# Patient Record
Sex: FEMALE | Race: WHITE | Employment: STUDENT | URBAN - METROPOLITAN AREA
[De-identification: names, ages, dates, MRNs, and addresses within clinical notes are randomized per-mention and may not be internally consistent; named-entity substitution may affect disease eponyms.]

---
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
## 2019-05-20 ENCOUNTER — OFFICE VISIT (OUTPATIENT)
Dept: FAMILY MEDICINE CLINIC | Facility: CLINIC | Age: 19
End: 2019-05-20

## 2019-05-20 VITALS
TEMPERATURE: 99 F | DIASTOLIC BLOOD PRESSURE: 80 MMHG | RESPIRATION RATE: 12 BRPM | BODY MASS INDEX: 19.32 KG/M2 | WEIGHT: 110.38 LBS | HEIGHT: 63.5 IN | HEART RATE: 80 BPM | SYSTOLIC BLOOD PRESSURE: 120 MMHG

## 2019-05-20 DIAGNOSIS — L02.91 ABSCESS: Primary | ICD-10-CM

## 2019-05-20 PROCEDURE — 99213 OFFICE O/P EST LOW 20 MIN: CPT | Performed by: FAMILY MEDICINE

## 2019-05-20 RX ORDER — SULFAMETHOXAZOLE AND TRIMETHOPRIM 800; 160 MG/1; MG/1
1 TABLET ORAL 2 TIMES DAILY
Qty: 20 TABLET | Refills: 0 | Status: SHIPPED | OUTPATIENT
Start: 2019-05-20 | End: 2019-05-30

## 2019-05-20 NOTE — PROGRESS NOTES
HPI:    Patient ID: Jalen Cross is a 20year old female. Patient presents with:  Abscess: under L arm      HPI   Patient is here with mom for 2 left underarm abscess for 3 days. She has a history of multiple abscess. Has not drained.  Red and hard to Cardiovascular: Normal rate. Pulmonary/Chest: Effort normal.   Skin: Lesion noted. Left axilla: 2 x 2 cm in diameter, another small lesion  tender abscess with erythema and induration. No fluctuance. No discharge. Both have not gotten to a head.

## 2019-06-05 ENCOUNTER — OFFICE VISIT (OUTPATIENT)
Dept: FAMILY MEDICINE CLINIC | Facility: CLINIC | Age: 19
End: 2019-06-05

## 2019-06-05 VITALS
RESPIRATION RATE: 16 BRPM | BODY MASS INDEX: 18.58 KG/M2 | HEART RATE: 72 BPM | HEIGHT: 64 IN | TEMPERATURE: 98 F | DIASTOLIC BLOOD PRESSURE: 66 MMHG | SYSTOLIC BLOOD PRESSURE: 102 MMHG | WEIGHT: 108.81 LBS

## 2019-06-05 DIAGNOSIS — Z00.00 ROUTINE HISTORY AND PHYSICAL EXAMINATION OF ADULT: Primary | ICD-10-CM

## 2019-06-05 DIAGNOSIS — Z87.2 HISTORY OF ABSCESS OF SKIN AND SUBCUTANEOUS TISSUE: ICD-10-CM

## 2019-06-05 DIAGNOSIS — L70.0 ACNE VULGARIS: ICD-10-CM

## 2019-06-05 PROCEDURE — 99395 PREV VISIT EST AGE 18-39: CPT | Performed by: FAMILY MEDICINE

## 2019-06-05 RX ORDER — DROSPIRENONE AND ETHINYL ESTRADIOL 0.02-3(28)
1 KIT ORAL DAILY
Qty: 3 PACKAGE | Refills: 3 | Status: SHIPPED | OUTPATIENT
Start: 2019-06-05 | End: 2020-04-01

## 2019-06-05 NOTE — PROGRESS NOTES
HPI:   Chris Corcoran is a 20year old female who presents for a complete physical exam.      Patient complains of nothing, feels great physically and mentally. School goes well, mom here as well, no concerns from her standpoint either.  Patient declines op feels well in general, no unexpected weight changes, no fevers  SKIN: denies any unusual skin lesions; acne helped by OCP  EYES:denies blurred vision or double vision  HEENT: denies nasal congestion, sinus pain or ST  LUNGS: denies shortness of breath with History of abscess of skin and subcutaneous tissue  Recent infection resolved; washing with hibiclens now, let me know if recurrence      The patient indicates understanding of these issues and agrees to the plan.   The patient is asked to return for CPX in

## 2019-11-25 ENCOUNTER — TELEPHONE (OUTPATIENT)
Dept: FAMILY MEDICINE CLINIC | Facility: CLINIC | Age: 19
End: 2019-11-25

## 2019-11-25 ENCOUNTER — NURSE ONLY (OUTPATIENT)
Dept: FAMILY MEDICINE CLINIC | Facility: CLINIC | Age: 19
End: 2019-11-25

## 2019-11-25 DIAGNOSIS — J02.9 SORE THROAT: Primary | ICD-10-CM

## 2019-11-25 PROCEDURE — 87880 STREP A ASSAY W/OPTIC: CPT | Performed by: FAMILY MEDICINE

## 2019-11-25 NOTE — TELEPHONE ENCOUNTER
Called the pt and advised that we can doa nurse primo and scheduled her for 2pm  Future Appointments   Date Time Provider Miriam Zelaya   11/25/2019  2:00 PM  [facility] [address] [facility] Saskia Lucas

## 2019-11-25 NOTE — PROGRESS NOTES
Rapid strep is negative- advised pt that we are sending the swab to the lab to confirm.  Advised to do salt water garggles, ibuprofen/tylenol  For discomfort, throat drops etc.  Advised that we will have the culture back in 3 days and if her symptoms get wo

## 2019-11-25 NOTE — TELEPHONE ENCOUNTER
Spoke with the pt and she has a sore throat for a few days and now there is white in the back of her throat and lymph nodes are swollen    No fever

## 2019-11-25 NOTE — TELEPHONE ENCOUNTER
Patient thinks she has strep wondering if anyone can see her. Possibly just a nurse apt for a swab? Please call back.

## 2019-11-30 ENCOUNTER — TELEPHONE (OUTPATIENT)
Dept: FAMILY MEDICINE CLINIC | Facility: CLINIC | Age: 19
End: 2019-11-30

## 2019-11-30 ENCOUNTER — OFFICE VISIT (OUTPATIENT)
Dept: FAMILY MEDICINE CLINIC | Facility: CLINIC | Age: 19
End: 2019-11-30

## 2019-11-30 VITALS
RESPIRATION RATE: 18 BRPM | SYSTOLIC BLOOD PRESSURE: 124 MMHG | HEART RATE: 96 BPM | WEIGHT: 110 LBS | DIASTOLIC BLOOD PRESSURE: 64 MMHG | TEMPERATURE: 98 F | BODY MASS INDEX: 18.78 KG/M2 | OXYGEN SATURATION: 99 % | HEIGHT: 64 IN

## 2019-11-30 DIAGNOSIS — L02.416 ABSCESS OF KNEE, LEFT: Primary | ICD-10-CM

## 2019-11-30 PROCEDURE — 99213 OFFICE O/P EST LOW 20 MIN: CPT | Performed by: PHYSICIAN ASSISTANT

## 2019-11-30 RX ORDER — SULFAMETHOXAZOLE AND TRIMETHOPRIM 800; 160 MG/1; MG/1
1 TABLET ORAL 2 TIMES DAILY
Qty: 20 TABLET | Refills: 0 | Status: SHIPPED | OUTPATIENT
Start: 2019-11-30 | End: 2019-12-10

## 2019-11-30 NOTE — PROGRESS NOTES
CHIEF COMPLAINT:   Patient presents with: Infection: LEFT KNEE BUMP INFECTION , WARM TO TOUCH , REDNESS X 1 WK . USING TRIPLE ABX OINT       HPI:   Amelia He is a 20year old female who presents for infection to her left knee.   Patient explains the l /64   Pulse 96   Temp 98.3 °F (36.8 °C) (Oral)   Resp 18   Ht 64\"   Wt 110 lb (49.9 kg)   LMP 10/30/2019 (Approximate)   SpO2 99%   BMI 18.88 kg/m²   GENERAL: well developed, well nourished,in no apparent distress  SKIN: + abscess 1cm diameter and s An abscess (sometimes called a “boil”) happens when bacteria get trapped under the skin and start to grow. Pus forms inside the abscess as the body responds to the bacteria.  An abscess can happen with an insect bite, ingrown hair, blocked oil gland, pimple © 7202-5063 [employer] [address]. [address] [facility], [address] [place]. All rights reserved. This information is not intended as a substitute for professional medical care. Always follow your healthcare professional's instructions.             The

## 2019-11-30 NOTE — TELEPHONE ENCOUNTER
----- Message from Wood Kimball MD sent at 11/29/2019 12:34 PM CST -----  Please notify throat cx negativce consistent with a viral process. Please let me know if you have any questions or if not improving.

## 2019-11-30 NOTE — PATIENT INSTRUCTIONS
-Line around wound- if becomes more worse or extends beyod the line, must be seen immediately. -Must be seen with any worsening symptoms, fevers, red streaks, or pain.        Abscess (Antibiotic Treatment Only)  An abscess (sometimes called a “boil”) happe by your healthcare provider  · Pus or fluid coming from the abscess  · Boil returns after getting better  Date Last Reviewed: 9/1/2016  © 3483-6423 [employer] [address]. [address] [facility], [address] [place] [address]. All rights reserved.  This information

## 2019-11-30 NOTE — TELEPHONE ENCOUNTER
Spoke with the pt and advised of the lab results- she v/u  I asked how she was feeling-she states that symptoms have resolved

## 2020-04-01 RX ORDER — DROSPIRENONE AND ETHINYL ESTRADIOL 0.02-3(28)
1 KIT ORAL DAILY
Qty: 84 TABLET | Refills: 0 | Status: SHIPPED | OUTPATIENT
Start: 2020-04-01 | End: 2020-06-17

## 2020-04-01 NOTE — TELEPHONE ENCOUNTER
Last OV 6/5/19  No pap in epic  Last refilled 6/5/19  #3  3 refills    Refilled x 3 months per protocol

## 2020-06-17 ENCOUNTER — TELEPHONE (OUTPATIENT)
Dept: FAMILY MEDICINE CLINIC | Facility: CLINIC | Age: 20
End: 2020-06-17

## 2020-06-17 ENCOUNTER — TELEMEDICINE (OUTPATIENT)
Dept: FAMILY MEDICINE CLINIC | Facility: CLINIC | Age: 20
End: 2020-06-17

## 2020-06-17 DIAGNOSIS — H00.011 HORDEOLUM EXTERNUM OF RIGHT UPPER EYELID: Primary | ICD-10-CM

## 2020-06-17 PROCEDURE — 99213 OFFICE O/P EST LOW 20 MIN: CPT | Performed by: FAMILY MEDICINE

## 2020-06-17 RX ORDER — DROSPIRENONE AND ETHINYL ESTRADIOL 0.02-3(28)
1 KIT ORAL DAILY
Qty: 84 TABLET | Refills: 1 | Status: SHIPPED | OUTPATIENT
Start: 2020-06-17 | End: 2020-12-07 | Stop reason: ALTCHOICE

## 2020-06-17 RX ORDER — SILICONES/ADHESIVE TAPE
1 COMBINATION PACKAGE (EA) TOPICAL EVERY 4 HOURS
Qty: 14.2 G | Refills: 0 | Status: SHIPPED | OUTPATIENT
Start: 2020-06-17 | End: 2020-06-24

## 2020-06-17 NOTE — TELEPHONE ENCOUNTER
Patient states stye in right eye since Monday. Was swimming in [address] [facility] water this past weekend. Eye is swollen and painful, swelling goes to the bridge of the nose on right side. Has been getting worse since Monday.  Denies any visual problems in vision o

## 2020-06-17 NOTE — TELEPHONE ENCOUNTER
Patient accepted this time. Verbal consent given for DOX visit.   Future Appointments   Date Time Provider Miriam Zelaya   6/17/2020 12:45 PM Ashlyn Qureshi MD [facility]

## 2020-06-17 NOTE — TELEPHONE ENCOUNTER
Pt called she thinks she has a stye in her eye, it started on Monday. Her eye is swollen and painful. It is swelling the bridge of her nose too.

## 2020-06-17 NOTE — PROGRESS NOTES
Taina Nieves is a 23year old female.   HPI:   Virtual Video/Telephone Check-In    Taina Nieves verbally consents to a doximity video visit on 6/17/2020    Patient understands and accepts financial responsibility for any deductible, co-insurance and/or Medications   Medication Sig Dispense Refill   • Bacitracin-Polymyxin B (POLYSPORIN) [id number] UNIT/GM External Ointment Apply 1 Application topically every 4 (four) hours for 7 days.  To upper lid right eye 14.2 g 0   • Drospirenone-Ethinyl Estradiol 3-0.0 worse  NECK: no JVD nor obvious mass  LUNGS: normal resp effort, no conversational dyspnea, no cough  CARDIO: appears well perfused  EXTREMITIES: moves all extremities  PSYCH: good affect, interactive, A&Ox2    ASSESSMENT AND PLAN:   Diagnoses and all orde

## 2020-06-18 ENCOUNTER — TELEPHONE (OUTPATIENT)
Dept: FAMILY MEDICINE CLINIC | Facility: CLINIC | Age: 20
End: 2020-06-18

## 2020-06-18 NOTE — TELEPHONE ENCOUNTER
Pt has a question about the  Bacitracin-Polymyxin B (POLYSPORIN) [id number] UNIT/GM External Ointment    It says not to get in her eyes but she is using it for a sty.    Also she used to ointment all day yesterday and this morning and hasn't seen any improve

## 2020-06-18 NOTE — TELEPHONE ENCOUNTER
That's odd it says that, it's specifically an eye ointment.   Not sure why it says that, she can ask her pharmacist.  But yes, it can take 3 days for abx to kick in, so not terribly surprising

## 2020-07-23 ENCOUNTER — TELEPHONE (OUTPATIENT)
Dept: FAMILY MEDICINE CLINIC | Facility: CLINIC | Age: 20
End: 2020-07-23

## 2020-07-23 RX ORDER — SULFAMETHOXAZOLE AND TRIMETHOPRIM 800; 160 MG/1; MG/1
1 TABLET ORAL 2 TIMES DAILY
Qty: 14 TABLET | Refills: 0 | Status: SHIPPED | OUTPATIENT
Start: 2020-07-23 | End: 2020-07-30

## 2020-07-23 NOTE — TELEPHONE ENCOUNTER
Patient states she gets abscesses under her arm and has another one. Gets them frequently. Noticed it 2 days ago. Has not gotten bigger today or yesterday. Area is a little red and painful.   States she wants to get antibiotic before it gets bad    Uses

## 2020-07-23 NOTE — TELEPHONE ENCOUNTER
She has a lump again under her arm she is asking for an antibiotic Please leave massage she is at work today.

## 2020-12-04 ENCOUNTER — OFFICE VISIT (OUTPATIENT)
Dept: FAMILY MEDICINE CLINIC | Facility: CLINIC | Age: 20
End: 2020-12-04

## 2020-12-04 VITALS
WEIGHT: 110.5 LBS | RESPIRATION RATE: 18 BRPM | DIASTOLIC BLOOD PRESSURE: 66 MMHG | OXYGEN SATURATION: 99 % | HEART RATE: 82 BPM | SYSTOLIC BLOOD PRESSURE: 114 MMHG | TEMPERATURE: 99 F | BODY MASS INDEX: 18.86 KG/M2 | HEIGHT: 64 IN

## 2020-12-04 DIAGNOSIS — L70.0 ACNE VULGARIS: Primary | ICD-10-CM

## 2020-12-04 DIAGNOSIS — Z11.1 SCREENING FOR TUBERCULOSIS: ICD-10-CM

## 2020-12-04 DIAGNOSIS — R21 SKIN RASH: ICD-10-CM

## 2020-12-04 PROCEDURE — 86580 TB INTRADERMAL TEST: CPT | Performed by: FAMILY MEDICINE

## 2020-12-04 PROCEDURE — 3074F SYST BP LT 130 MM HG: CPT | Performed by: FAMILY MEDICINE

## 2020-12-04 PROCEDURE — 99213 OFFICE O/P EST LOW 20 MIN: CPT | Performed by: FAMILY MEDICINE

## 2020-12-04 PROCEDURE — 3078F DIAST BP <80 MM HG: CPT | Performed by: FAMILY MEDICINE

## 2020-12-04 PROCEDURE — 3008F BODY MASS INDEX DOCD: CPT | Performed by: FAMILY MEDICINE

## 2020-12-04 NOTE — PROGRESS NOTES
Tyra Phelps is a 23year old female. HPI:   Patient c/o acne but a new verion for a few months that looks different--no white or black head, nothing comes out when squeezing and can scrape off top layer and then comes back.     She did stop her pill, co GENERAL: well developed, well nourished,in no apparent distress  SKIN: face with a few small white heads on chin and cheeks, chest and upper back; forehead with a slightly raised, fleshy to slightly pink colored patch just above R eyebrow  NECK: supple,n

## 2020-12-07 ENCOUNTER — NURSE ONLY (OUTPATIENT)
Dept: FAMILY MEDICINE CLINIC | Facility: CLINIC | Age: 20
End: 2020-12-07

## 2020-12-07 NOTE — PROGRESS NOTES
Pt presents to clinic for TB read  Recent Results (from the past 24 hour(s))   TB INTRADERMAL TEST    Collection Time: 12/07/20  3:14 PM   Result Value Ref Range    Date Given: 12/4/2020     Site: left forearm     INDURATION (PPD) 0 0.0 - 11 mm     Negativ

## 2020-12-12 ENCOUNTER — TELEPHONE (OUTPATIENT)
Dept: FAMILY MEDICINE CLINIC | Facility: CLINIC | Age: 20
End: 2020-12-12

## 2020-12-12 NOTE — TELEPHONE ENCOUNTER
Pt would like to know if [facility] can squeeze her in for a [address] before the 1-4-21. Or if she can see another doctor. Advised her [facility] is out of office today and we might not have answer until Monday.    Please return call to [phone]

## 2020-12-14 NOTE — TELEPHONE ENCOUNTER
Left message for the pt that Dr. Tyrel Rueda had an opening in her schedule for Dec 30  Asked her to call back and schedule

## 2020-12-30 ENCOUNTER — OFFICE VISIT (OUTPATIENT)
Dept: FAMILY MEDICINE CLINIC | Facility: CLINIC | Age: 20
End: 2020-12-30

## 2020-12-30 ENCOUNTER — MED REC SCAN ONLY (OUTPATIENT)
Dept: FAMILY MEDICINE CLINIC | Facility: CLINIC | Age: 20
End: 2020-12-30

## 2020-12-30 ENCOUNTER — PATIENT MESSAGE (OUTPATIENT)
Dept: FAMILY MEDICINE CLINIC | Facility: CLINIC | Age: 20
End: 2020-12-30

## 2020-12-30 VITALS
RESPIRATION RATE: 16 BRPM | HEIGHT: 65 IN | DIASTOLIC BLOOD PRESSURE: 60 MMHG | TEMPERATURE: 98 F | HEART RATE: 80 BPM | WEIGHT: 111 LBS | BODY MASS INDEX: 18.49 KG/M2 | SYSTOLIC BLOOD PRESSURE: 110 MMHG

## 2020-12-30 DIAGNOSIS — Z00.00 ROUTINE HISTORY AND PHYSICAL EXAMINATION OF ADULT: Primary | ICD-10-CM

## 2020-12-30 DIAGNOSIS — L70.0 ACNE VULGARIS: ICD-10-CM

## 2020-12-30 DIAGNOSIS — R21 SKIN RASH: ICD-10-CM

## 2020-12-30 PROCEDURE — 3078F DIAST BP <80 MM HG: CPT | Performed by: FAMILY MEDICINE

## 2020-12-30 PROCEDURE — 3008F BODY MASS INDEX DOCD: CPT | Performed by: FAMILY MEDICINE

## 2020-12-30 PROCEDURE — 99395 PREV VISIT EST AGE 18-39: CPT | Performed by: FAMILY MEDICINE

## 2020-12-30 PROCEDURE — 3074F SYST BP LT 130 MM HG: CPT | Performed by: FAMILY MEDICINE

## 2020-12-30 NOTE — PROGRESS NOTES
.HPI:   Toney Yoder is a 23year old female who presents for a complete physical exam.      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   She wants status: Never Smoker      Smokeless tobacco: Never Used      Tobacco comment: Non-Smoker    Alcohol use:  Yes      Alcohol/week: 0.0 standard drinks      Comment: occ    Drug use: No       REVIEW OF SYSTEMS:   GENERAL: feels well in general, no unexpected w research suggests a plant based diet with 5-10 servings of fruits/veggies daily, rich in legumes and complex grains is a healthy foundation for eating.  Limiting animal products, but when consuming choosing lean cuts, limiting or avoiding processed meats an

## 2020-12-30 NOTE — TELEPHONE ENCOUNTER
From: Kiley Llanes  To:  Chance Castellanos MD  Sent: 12/30/2020 9:29 AM CST  Subject: Visit Follow-up Question    Physical

## 2021-05-10 RX ORDER — DROSPIRENONE AND ETHINYL ESTRADIOL 0.02-3(28)
1 KIT ORAL DAILY
Qty: 84 TABLET | Refills: 1 | Status: SHIPPED | OUTPATIENT
Start: 2021-05-10

## 2021-05-10 NOTE — TELEPHONE ENCOUNTER
PT CALLED AND ADV WOULD LIKE TO GO BACK ON HER BIRTH CONTROL. LAST OFFICE VISIT WAS 12/30/21. DOES NOT HAVE NAME OF BIRTH CONTROL.     IF SENT PLEASE SEND  [address]

## 2021-05-10 NOTE — TELEPHONE ENCOUNTER
Last refilled on 06/17/2020 for # [address] with 0 rf. Last seen on 12/30/2020. I pended what she was on in the past.      Thank you.

## 2022-02-03 ENCOUNTER — OFFICE VISIT (OUTPATIENT)
Dept: FAMILY MEDICINE CLINIC | Facility: CLINIC | Age: 22
End: 2022-02-03
Payer: COMMERCIAL

## 2022-02-03 VITALS
TEMPERATURE: 98 F | SYSTOLIC BLOOD PRESSURE: 116 MMHG | HEIGHT: 64 IN | RESPIRATION RATE: 16 BRPM | BODY MASS INDEX: 17.99 KG/M2 | WEIGHT: 105.38 LBS | DIASTOLIC BLOOD PRESSURE: 70 MMHG | HEART RATE: 77 BPM | OXYGEN SATURATION: 98 %

## 2022-02-03 DIAGNOSIS — Z00.00 ANNUAL PHYSICAL EXAM: Primary | ICD-10-CM

## 2022-02-03 PROCEDURE — 99395 PREV VISIT EST AGE 18-39: CPT | Performed by: FAMILY MEDICINE

## 2022-02-03 PROCEDURE — 3074F SYST BP LT 130 MM HG: CPT | Performed by: FAMILY MEDICINE

## 2022-02-03 PROCEDURE — 3078F DIAST BP <80 MM HG: CPT | Performed by: FAMILY MEDICINE

## 2022-02-03 PROCEDURE — 3008F BODY MASS INDEX DOCD: CPT | Performed by: FAMILY MEDICINE

## 2022-02-03 RX ORDER — SPIRONOLACTONE 50 MG/1
50 TABLET, FILM COATED ORAL 2 TIMES DAILY
COMMUNITY
Start: 2021-11-15

## 2022-02-11 ENCOUNTER — TELEPHONE (OUTPATIENT)
Dept: FAMILY MEDICINE CLINIC | Facility: CLINIC | Age: 22
End: 2022-02-11

## 2022-02-11 RX ORDER — DROSPIRENONE AND ETHINYL ESTRADIOL 0.02-3(28)
1 KIT ORAL DAILY
Qty: 84 TABLET | Refills: 1 | Status: SHIPPED | OUTPATIENT
Start: 2022-02-11

## 2022-02-11 NOTE — TELEPHONE ENCOUNTER
Left message to voicemail (per verbal release form consent, confirmed with identifying message.) Patient advised to call office back [phone] - inquiring reason for Derm referral.   Pt to call back        LOV 02/03/2022  Last refill on 05/10/2021, for #84 tabs, with 1 refills  Drospirenone-Ethinyl Estradiol 3-0.02 MG Oral Tab    Gynecology Medication Protocol Failed 02/11/2022 02:57 PM    PASS-PENDING LAST PAP WNL--VIA MANUAL LOOKUP    Physical or Pelvic/Breast in past 12 or next 3 mos--VIA MANUAL LOOKUP   No future appointments. Order(s) pending, please review. Thank you.

## 2022-02-11 NOTE — TELEPHONE ENCOUNTER
Pt called needs referral for dermatologist Dr. Gera Feng     Also pt would like a refill on [facility] states medication was never sent to [facility] [address], [address] [facility] [address], [phone], [phone]      Pt call back # [phone]    Thank you

## 2022-02-12 NOTE — TELEPHONE ENCOUNTER
Derm referral - for acne, follow-up acne medication,  \"bumps on face had to freeze off\"  For continuation of care    Pt reports she has appt on Monday afternoon 02/14/22    Sending to referrals dept to address    Derm referral order pending, please review.  Thank you

## 2022-05-06 RX ORDER — DROSPIRENONE AND ETHINYL ESTRADIOL 0.02-3(28)
1 KIT ORAL DAILY
Qty: 84 TABLET | Refills: 1 | OUTPATIENT
Start: 2022-05-06

## 2022-05-06 NOTE — TELEPHONE ENCOUNTER
Refill request too soon    Medication Quantity Refills Start End   Drospirenone-Ethinyl Estradiol 3-0.02 MG Oral Tab 84 tablet 1 2/11/2022      Refill refused

## 2022-06-20 ENCOUNTER — OFFICE VISIT (OUTPATIENT)
Dept: FAMILY MEDICINE CLINIC | Facility: CLINIC | Age: 22
End: 2022-06-20
Payer: COMMERCIAL

## 2022-06-20 VITALS
BODY MASS INDEX: 18 KG/M2 | TEMPERATURE: 98 F | WEIGHT: 102 LBS | OXYGEN SATURATION: 100 % | DIASTOLIC BLOOD PRESSURE: 80 MMHG | HEART RATE: 83 BPM | SYSTOLIC BLOOD PRESSURE: 112 MMHG | RESPIRATION RATE: 16 BRPM

## 2022-06-20 DIAGNOSIS — L30.9 DERMATITIS: Primary | ICD-10-CM

## 2022-06-20 PROCEDURE — 99214 OFFICE O/P EST MOD 30 MIN: CPT | Performed by: FAMILY MEDICINE

## 2022-06-20 PROCEDURE — 3079F DIAST BP 80-89 MM HG: CPT | Performed by: FAMILY MEDICINE

## 2022-06-20 PROCEDURE — 3074F SYST BP LT 130 MM HG: CPT | Performed by: FAMILY MEDICINE

## 2022-07-29 ENCOUNTER — OFFICE VISIT (OUTPATIENT)
Dept: FAMILY MEDICINE CLINIC | Facility: CLINIC | Age: 22
End: 2022-07-29
Payer: COMMERCIAL

## 2022-07-29 VITALS
WEIGHT: 104.13 LBS | OXYGEN SATURATION: 99 % | HEART RATE: 75 BPM | SYSTOLIC BLOOD PRESSURE: 110 MMHG | RESPIRATION RATE: 16 BRPM | DIASTOLIC BLOOD PRESSURE: 68 MMHG | BODY MASS INDEX: 18 KG/M2 | TEMPERATURE: 98 F

## 2022-07-29 DIAGNOSIS — Z71.3 DIETARY COUNSELING: ICD-10-CM

## 2022-07-29 DIAGNOSIS — K59.00 CONSTIPATION, UNSPECIFIED CONSTIPATION TYPE: Primary | ICD-10-CM

## 2022-07-29 DIAGNOSIS — R10.13 DYSPEPSIA: ICD-10-CM

## 2022-07-29 PROCEDURE — 3074F SYST BP LT 130 MM HG: CPT | Performed by: FAMILY MEDICINE

## 2022-07-29 PROCEDURE — 99214 OFFICE O/P EST MOD 30 MIN: CPT | Performed by: FAMILY MEDICINE

## 2022-07-29 PROCEDURE — 3078F DIAST BP <80 MM HG: CPT | Performed by: FAMILY MEDICINE

## 2022-07-29 RX ORDER — CLOBETASOL PROPIONATE 0.5 MG/G
1 CREAM TOPICAL 2 TIMES DAILY
Qty: 45 G | Refills: 0 | Status: SHIPPED | OUTPATIENT
Start: 2022-07-29 | End: 2022-08-12

## 2022-08-01 RX ORDER — DROSPIRENONE AND ETHINYL ESTRADIOL 0.02-3(28)
1 KIT ORAL DAILY
Qty: 84 TABLET | Refills: 1 | Status: SHIPPED | OUTPATIENT
Start: 2022-08-01

## 2022-08-01 NOTE — TELEPHONE ENCOUNTER
Last visit 07/29/2022  Last refill 02/11/2022      Gynecology Medication Protocol Failed 08/01/2022 09:24 AM    PASS-PENDING LAST PAP WNL--VIA MANUAL LOOKUP    Physical or Pelvic/Breast in past 12 or next 3 mos--VIA MANUAL

## 2022-08-12 ENCOUNTER — PATIENT MESSAGE (OUTPATIENT)
Dept: FAMILY MEDICINE CLINIC | Facility: CLINIC | Age: 22
End: 2022-08-12

## 2022-08-13 NOTE — TELEPHONE ENCOUNTER
From: Stephanie Llanes  To: Nithin Shukla MD  Sent: 8/12/2022 6:35 PM CDT  Subject: Probiotic     Hello! Am I able to take a probiotic gummy that is one of the brands you suggested? Or does it have to be pill form?

## 2023-01-16 RX ORDER — DROSPIRENONE AND ETHINYL ESTRADIOL 0.02-3(28)
1 KIT ORAL DAILY
Qty: 84 TABLET | Refills: 1 | Status: SHIPPED | OUTPATIENT
Start: 2023-01-16

## 2023-01-16 NOTE — TELEPHONE ENCOUNTER
Gynecology Medication Protocol Failed 01/14/2023 12:27 PM    PASS-PENDING LAST PAP WNL--VIA MANUAL LOOKUP    Physical or Pelvic/Breast in past 12 or next 3 mos--VIA MANUAL LOOKUP     Last OV:07/29/202, sick, 02/03/2022, physical   Last refill:08/01/2022, 84 tabs, 1 refill    Medication pended, please sign if appropriate

## 2023-05-15 ENCOUNTER — TELEMEDICINE (OUTPATIENT)
Dept: FAMILY MEDICINE CLINIC | Facility: CLINIC | Age: 23
End: 2023-05-15

## 2023-05-15 DIAGNOSIS — H10.502 BLEPHAROCONJUNCTIVITIS OF LEFT EYE, UNSPECIFIED BLEPHAROCONJUNCTIVITIS TYPE: Primary | ICD-10-CM

## 2023-05-15 RX ORDER — POLYMYXIN B SULFATE AND TRIMETHOPRIM 1; 10000 MG/ML; [USP'U]/ML
1 SOLUTION OPHTHALMIC EVERY 4 HOURS
Qty: 10 ML | Refills: 0 | Status: SHIPPED | OUTPATIENT
Start: 2023-05-15 | End: 2023-05-20

## 2023-06-05 ENCOUNTER — TELEPHONE (OUTPATIENT)
Dept: FAMILY MEDICINE CLINIC | Facility: CLINIC | Age: 23
End: 2023-06-05

## 2023-06-05 NOTE — TELEPHONE ENCOUNTER
LIGIA:   Pt is coming in for a physical with [employer] and needs a TB test.  She wants the blood TB test.  Thanks!

## 2023-06-09 ENCOUNTER — OFFICE VISIT (OUTPATIENT)
Dept: FAMILY MEDICINE CLINIC | Facility: CLINIC | Age: 23
End: 2023-06-09
Payer: COMMERCIAL

## 2023-06-09 VITALS
TEMPERATURE: 98 F | OXYGEN SATURATION: 100 % | DIASTOLIC BLOOD PRESSURE: 80 MMHG | BODY MASS INDEX: 16.73 KG/M2 | HEART RATE: 95 BPM | SYSTOLIC BLOOD PRESSURE: 120 MMHG | HEIGHT: 64 IN | RESPIRATION RATE: 16 BRPM | WEIGHT: 98 LBS

## 2023-06-09 DIAGNOSIS — Z02.1 PRE-EMPLOYMENT EXAMINATION: Primary | ICD-10-CM

## 2023-06-09 DIAGNOSIS — Z11.1 SCREENING FOR TUBERCULOSIS: ICD-10-CM

## 2023-06-09 DIAGNOSIS — F41.9 ANXIETY: ICD-10-CM

## 2023-06-09 LAB
BASOPHILS # BLD AUTO: 0.05 X10(3) UL (ref 0–0.2)
BASOPHILS NFR BLD AUTO: 0.8 %
EOSINOPHIL # BLD AUTO: 0.13 X10(3) UL (ref 0–0.7)
EOSINOPHIL NFR BLD AUTO: 2 %
ERYTHROCYTE [DISTWIDTH] IN BLOOD BY AUTOMATED COUNT: 12.7 %
HCT VFR BLD AUTO: 43.4 %
HGB BLD-MCNC: 14.3 G/DL
IMM GRANULOCYTES # BLD AUTO: 0.02 X10(3) UL (ref 0–1)
IMM GRANULOCYTES NFR BLD: 0.3 %
LYMPHOCYTES # BLD AUTO: 2.43 X10(3) UL (ref 1–4)
LYMPHOCYTES NFR BLD AUTO: 37.3 %
MCH RBC QN AUTO: 28.5 PG (ref 26–34)
MCHC RBC AUTO-ENTMCNC: 32.9 G/DL (ref 31–37)
MCV RBC AUTO: 86.6 FL
MONOCYTES # BLD AUTO: 0.57 X10(3) UL (ref 0.1–1)
MONOCYTES NFR BLD AUTO: 8.7 %
NEUTROPHILS # BLD AUTO: 3.32 X10 (3) UL (ref 1.5–7.7)
NEUTROPHILS # BLD AUTO: 3.32 X10(3) UL (ref 1.5–7.7)
NEUTROPHILS NFR BLD AUTO: 50.9 %
PLATELET # BLD AUTO: 340 10(3)UL (ref 150–450)
RBC # BLD AUTO: 5.01 X10(6)UL
TSI SER-ACNC: 1.49 MIU/ML (ref 0.36–3.74)
WBC # BLD AUTO: 6.5 X10(3) UL (ref 4–11)

## 2023-06-09 PROCEDURE — 85025 COMPLETE CBC W/AUTO DIFF WBC: CPT | Performed by: NURSE PRACTITIONER

## 2023-06-09 PROCEDURE — 84443 ASSAY THYROID STIM HORMONE: CPT | Performed by: NURSE PRACTITIONER

## 2023-06-09 PROCEDURE — 86480 TB TEST CELL IMMUN MEASURE: CPT | Performed by: NURSE PRACTITIONER

## 2023-06-09 PROCEDURE — 3008F BODY MASS INDEX DOCD: CPT | Performed by: NURSE PRACTITIONER

## 2023-06-09 PROCEDURE — 99214 OFFICE O/P EST MOD 30 MIN: CPT | Performed by: NURSE PRACTITIONER

## 2023-06-09 PROCEDURE — 3074F SYST BP LT 130 MM HG: CPT | Performed by: NURSE PRACTITIONER

## 2023-06-09 PROCEDURE — 3079F DIAST BP 80-89 MM HG: CPT | Performed by: NURSE PRACTITIONER

## 2023-06-09 RX ORDER — HYDROXYZINE HYDROCHLORIDE 10 MG/1
10 TABLET, FILM COATED ORAL 3 TIMES DAILY PRN
Qty: 30 TABLET | Refills: 0 | Status: SHIPPED | OUTPATIENT
Start: 2023-06-09

## 2023-06-09 NOTE — PATIENT INSTRUCTIONS
Ideal body weight:  110lb to 140lb    Consider counseling services if you'd like - Hannah Gill is our counselor here that can help    Consider Netflix - headspace guide to meditation for different perspectives on managing thoughts

## 2023-06-10 NOTE — PROGRESS NOTES
TSH - thyroid is functioning normally  CBC - blood counts are normal    Awaiting TB screening, will update paperwork once results are received

## 2023-06-12 ENCOUNTER — TELEPHONE (OUTPATIENT)
Dept: FAMILY MEDICINE CLINIC | Facility: CLINIC | Age: 23
End: 2023-06-12

## 2023-06-12 LAB
M TB IFN-G CD4+ T-CELLS BLD-ACNC: 0.01 IU/ML
M TB TUBERC IFN-G BLD QL: NEGATIVE
M TB TUBERC IGNF/MITOGEN IGNF CONTROL: >10 IU/ML
QFT TB1 AG MINUS NIL: 0 IU/ML
QFT TB2 AG MINUS NIL: 0 IU/ML

## 2023-06-12 NOTE — TELEPHONE ENCOUNTER
Left detailed message to voicemail (per verbal release form consent with confirmed identifying message) of APRN's note below. Patient was advised will fax to fax# on form, that copy available at , and/or to call office back with any questions/concerns.     Form faxed to [id number] KZX#[phone]    Original copy placed in pt blue book for pt

## 2023-06-12 NOTE — TELEPHONE ENCOUNTER
----- Message from Fredricka Carrel, APRN sent at 6/12/2023  8:35 AM CDT -----  Negative quant gold - paperwork has been completed - ready for patient , copy made and sent to be scanned in.   [facility]

## 2023-06-13 ENCOUNTER — TELEPHONE (OUTPATIENT)
Dept: FAMILY MEDICINE CLINIC | Facility: CLINIC | Age: 23
End: 2023-06-13

## 2023-06-13 NOTE — TELEPHONE ENCOUNTER
Please reach out to patient to see if hydroxyzine has helped at all with feelings of anxiety. Any improvement? Severity on 0 - 10 scale? If limited improvement we can replace with a very low dose of ativan as discussed at last office visit. If we decide to replace hydroxyzine with ativan, requesting follow-up appointment in 2 weeks via video visit. If would like to continue hydroxyzine, advising follow-up in a year or earlier as needed.

## 2023-06-13 NOTE — TELEPHONE ENCOUNTER
Patient states she did not start medication yet.   Will call or dEil SANCHEZ next week with update on symptoms with medication

## 2023-06-15 ENCOUNTER — MED REC SCAN ONLY (OUTPATIENT)
Dept: FAMILY MEDICINE CLINIC | Facility: CLINIC | Age: 23
End: 2023-06-15

## 2023-07-05 NOTE — TELEPHONE ENCOUNTER
Last refilled 1/16/23  LOV with [facility] 6/9/23  No future appt  Denied getting pap 2/3/22 OV with MM  Routed to pcp to advise      Gynecology Medication Protocol Efwkdg4907/03/2023 11:11 PM    PASS-PENDING LAST PAP WNL--VIA MANUAL LOOKUP    Physical or Pelvic/Breast in past 12 or next 3 mos--VIA MANUAL LOOKUP

## 2023-07-06 ENCOUNTER — TELEPHONE (OUTPATIENT)
Dept: FAMILY MEDICINE CLINIC | Facility: CLINIC | Age: 23
End: 2023-07-06

## 2023-07-06 NOTE — TELEPHONE ENCOUNTER
SPOKE WITH MOM - PT UNDER THE IMPRESSION THAT PT WAS COMING IN FOR ANNUAL PX.   WOULD LIKE TO SEE IF APT CAN BE CHANGE TO  [id number]  MOM AWARE THAT PT ALSO TALKED ABOUT OTHER THINGS.   MOM V/U

## 2023-07-06 NOTE — TELEPHONE ENCOUNTER
PATIENT'S MOTHER OFELIA IS CALLING WITH A QUICK QUESTION. MOM ASKING WHAT THE DIFFERENCE IS BETWEEN AN EMPLOYMENT RELATED EXAM AND PHYSICAL. PATIENT INSURANCE IS NOT COVERING ANY OF HER VISIT. Physical Therapy  Attempted to see patient for PT re-evaluation but patient adamantly refusing any mobility at this time. Educated on purpose of PT but patient still declines. Will check on patient tomorrow but may need to discharge from therapy if patient unwilling to participate.

## 2023-07-06 NOTE — TELEPHONE ENCOUNTER
Spoke with mom, advised note below. Mom would like to know why lab work was ordered (TSH/CBC)? Can procedure code be changed?     Please advise thank you

## 2023-07-06 NOTE — TELEPHONE ENCOUNTER
Removal of pre-employment physical, added routine health maintenance and billed for preventive care. Please let her know that billing codes have been changed.

## 2023-07-06 NOTE — TELEPHONE ENCOUNTER
A pre-employment physical gives us the opportunity to identify any possible infectious diseases and current problems that would interfere with the patient's ability to safely perform duties at work. A annual wellness exam focuses on preventive measures. During preventive exams we screen for risk factors and what can be done to optimize health. So we focus more on what we can do to prevent disease from occurring. The purpose of the pre-employment physical exam is to determine if patient is healthy enough to work. The purpose of an annual wellness exam is to identify risk factors and disease prevention.

## 2023-07-07 NOTE — TELEPHONE ENCOUNTER
Left detailed message to voicemail (per verbal release form consent with confirmed identifying message) of APRN's note below. Patient was advised to call office back with any questions/concerns.

## 2023-07-10 RX ORDER — DROSPIRENONE AND ETHINYL ESTRADIOL 0.02-3(28)
1 KIT ORAL DAILY
Qty: 84 TABLET | Refills: 0 | Status: SHIPPED | OUTPATIENT
Start: 2023-07-10

## 2023-09-16 ENCOUNTER — OFFICE VISIT (OUTPATIENT)
Dept: FAMILY MEDICINE CLINIC | Facility: CLINIC | Age: 23
End: 2023-09-16
Payer: COMMERCIAL

## 2023-09-16 ENCOUNTER — TELEPHONE (OUTPATIENT)
Dept: FAMILY MEDICINE CLINIC | Facility: CLINIC | Age: 23
End: 2023-09-16

## 2023-09-16 VITALS
SYSTOLIC BLOOD PRESSURE: 104 MMHG | HEART RATE: 105 BPM | OXYGEN SATURATION: 99 % | DIASTOLIC BLOOD PRESSURE: 60 MMHG | WEIGHT: 102 LBS | TEMPERATURE: 98 F | BODY MASS INDEX: 18 KG/M2

## 2023-09-16 DIAGNOSIS — J02.9 PHARYNGITIS, UNSPECIFIED ETIOLOGY: Primary | ICD-10-CM

## 2023-09-16 DIAGNOSIS — J02.0 STREP THROAT: ICD-10-CM

## 2023-09-16 DIAGNOSIS — R50.81 FEVER IN OTHER DISEASES: ICD-10-CM

## 2023-09-16 LAB
CONTROL LINE PRESENT WITH A CLEAR BACKGROUND (YES/NO): YES YES/NO
KIT LOT #: 6668 NUMERIC
STREP GRP A CUL-SCR: POSITIVE

## 2023-09-16 PROCEDURE — 99214 OFFICE O/P EST MOD 30 MIN: CPT | Performed by: FAMILY MEDICINE

## 2023-09-16 PROCEDURE — 3078F DIAST BP <80 MM HG: CPT | Performed by: FAMILY MEDICINE

## 2023-09-16 PROCEDURE — 87880 STREP A ASSAY W/OPTIC: CPT | Performed by: FAMILY MEDICINE

## 2023-09-16 PROCEDURE — 3074F SYST BP LT 130 MM HG: CPT | Performed by: FAMILY MEDICINE

## 2023-09-16 RX ORDER — AMOXICILLIN 500 MG/1
500 CAPSULE ORAL 2 TIMES DAILY
Qty: 14 CAPSULE | Refills: 0 | Status: SHIPPED | OUTPATIENT
Start: 2023-09-16 | End: 2023-09-23

## 2023-10-04 NOTE — TELEPHONE ENCOUNTER
Received fax from [facility] regarding refill request    LOV 09/16/23  Last refill on 07/10/23, for #84 tabs, with 0 refills  Drospirenone-Ethinyl Estradiol 3-0.02 MG Oral Tab   Gynecology Medication Protocol Hbaqru66/04/2023 11:34 AM    PASS-PENDING LAST PAP WNL--VIA MANUAL LOOKUP    Physical or Pelvic/Breast in past 12 or next 3 mos--VIA MANUAL LOOKUP     No future appointments. Order(s) pending, please review. Thank you.

## 2023-10-06 RX ORDER — DROSPIRENONE AND ETHINYL ESTRADIOL 0.02-3(28)
1 KIT ORAL DAILY
Qty: 84 TABLET | Refills: 0 | Status: SHIPPED | OUTPATIENT
Start: 2023-10-06

## 2023-12-27 RX ORDER — DROSPIRENONE AND ETHINYL ESTRADIOL 0.02-3(28)
1 KIT ORAL DAILY
Qty: 84 TABLET | Refills: 0 | Status: SHIPPED | OUTPATIENT
Start: 2023-12-27

## 2023-12-27 NOTE — TELEPHONE ENCOUNTER
Gynecology Medication Protocol Fdcpam0412/27/2023 11:25 AM    PASS-PENDING LAST PAP WNL--VIA MANUAL LOOKUP    Physical or Pelvic/Breast in past 12 or next 3 mos--VIA MANUAL LOOKUP   Routing to provider per protocol. Drospirenone-Ethinyl Estradiol 3-0.02 MG Oral Tab   Last refilled on 10/6/23 for #84  with 0 rf. Last labs 6/9/23. Last seen on 9/16/23. No future appointments. Thank you.

## 2024-01-22 ENCOUNTER — TELEPHONE (OUTPATIENT)
Dept: FAMILY MEDICINE CLINIC | Facility: CLINIC | Age: 24
End: 2024-01-22

## 2024-01-22 NOTE — TELEPHONE ENCOUNTER
----- Message from Gale Whitaker CNA sent at 1/22/2024  3:39 PM CST -----  THIS NEEDS TO BE CHANGED ON THE CLINICAL END - NOT CLERICAL.   PLEASE CHANGE DX UNDER LABS FOR VISIT 6/9/23    THANK YOU   ----- Message -----  From: Bryanna Abdalla RN  Sent: 1/22/2024   3:31 PM CST  To: Gale Whitaker CNA      ----- Message -----  From: Chante Wilson  Sent: 1/22/2024   3:14 PM CST  To: Yelitza Martin Nurse    Need to print corrected order for labs on 6/9/23. Needs to have Z00.00 as primary and only Dx -is not covering under work exam.  Needs to be printed and signed- please email to me so that I can forward to medical records for the correction.    Thank You,    Chante  ----- Message -----  From: Gale Whitaker CNA  Sent: 1/22/2024   3:03 PM CST  To: Chante Wilson; Radha Calix, RN    MOM HAS CALLED AGAIN AND LOOKING FOR A STATUS UPDATE ON PTS OFFICE VISIT ON 6/9/23.    PT HAD ANNUAL WELLNESS EXAM - CODED 31955    LOOKS LIKE TE ON 6/13/23    WHEN PT DID LAB WORK, IT WAS ADV THAT LAB WORK WAS NOT CODED PREVENTATIVE AND PT HAS BEEN CHARGED $744, THIS IS NOW IN COLLECTIONS.    ON OUR END, THIS DOES NOT SHOW ANY MONIES DUE OR IN COLLECTION.    TRIED CALLED HOSPITAL BILLING AND WAS ON HOLD FOR OVER 20 MINS.    LET ME KNOW YOUR THOUGHTS

## 2024-01-22 NOTE — TELEPHONE ENCOUNTER
Code has already been changed and routine health screening has been added.    Updated labs and prioritized routine screening diagnosis

## 2024-03-18 RX ORDER — DROSPIRENONE AND ETHINYL ESTRADIOL 0.02-3(28)
1 KIT ORAL DAILY
Qty: 84 TABLET | Refills: 0 | OUTPATIENT
Start: 2024-03-18

## 2024-03-18 NOTE — TELEPHONE ENCOUNTER
LOV 9/16/23 for a sore throat.      Gynecology Medication Protocol Ohpjdq7503/17/2024 05:56 PM    PASS-PENDING LAST PAP WNL--VIA MANUAL LOOKUP    Physical or Pelvic/Breast in past 12 or next 3 mos--VIA MANUAL LOOKUP        Physical due in June.  Overdue pap.  Please advise.  No future appointments.

## 2024-03-25 RX ORDER — DROSPIRENONE AND ETHINYL ESTRADIOL 0.02-3(28)
1 KIT ORAL DAILY
Qty: 84 TABLET | Refills: 0 | Status: SHIPPED | OUTPATIENT
Start: 2024-03-25

## 2024-03-25 NOTE — TELEPHONE ENCOUNTER
Pt out of medication and is requesting refill to [facility] on [address] ([facility])

## 2024-04-01 ENCOUNTER — LAB REQUISITION (OUTPATIENT)
Dept: LAB | Facility: HOSPITAL | Age: 24
End: 2024-04-01
Payer: COMMERCIAL

## 2024-04-01 DIAGNOSIS — L53.8 OTHER SPECIFIED ERYTHEMATOUS CONDITIONS: ICD-10-CM

## 2024-04-01 DIAGNOSIS — R20.8 OTHER DISTURBANCES OF SKIN SENSATION: ICD-10-CM

## 2024-04-01 DIAGNOSIS — D48.5 NEOPLASM OF UNCERTAIN BEHAVIOR OF SKIN: ICD-10-CM

## 2024-04-01 PROCEDURE — 88305 TISSUE EXAM BY PATHOLOGIST: CPT

## 2024-06-14 RX ORDER — DROSPIRENONE AND ETHINYL ESTRADIOL 0.02-3(28)
1 KIT ORAL DAILY
Qty: 84 TABLET | Refills: 0 | Status: SHIPPED | OUTPATIENT
Start: 2024-06-14

## 2024-06-14 NOTE — TELEPHONE ENCOUNTER
Gynecology Medication Protocol Xisutu1606/13/2024 08:04 PM    PASS-PENDING LAST PAP WNL--VIA MANUAL LOOKUP    Physical or Pelvic/Breast in past 12 or next 3 mos--VIA MANUAL LOOKUP      Routing to provider per protocol.   Drospirenone-Ethinyl Estradiol 3-0.02 MG Oral Tab   Last refilled on 3/25/24 for #84  with 0 rf.   Last labs 6/9/23.   Last seen on 9/16/23.     No future appointments.       Thank you.

## 2024-09-09 RX ORDER — DROSPIRENONE AND ETHINYL ESTRADIOL 0.02-3(28)
1 KIT ORAL DAILY
Qty: 84 TABLET | Refills: 0 | Status: SHIPPED | OUTPATIENT
Start: 2024-09-09

## 2024-09-09 NOTE — TELEPHONE ENCOUNTER
Pt failed refill protocol for the following reasons:    Requested Renewals     Name from pharmacy: DROSPIRENONE/ETHY EST 3/0.02MG T 28         Will file in chart as: DROSPIRENONE-ETHINYL ESTRADIOL 3-0.02 MG Oral Tab    Sig: TAKE 1 TABLET BY MOUTH DAILY    Disp: 84 tablet    Refills: 0 (Pharmacy requested: Not specified)    Start: 9/8/2024    Class: Normal    Non-formulary    Last ordered: 2 months ago (6/14/2024) by Remi Mcneill MD    Last refill: 6/14/2024    Rx #: [id number]    Gynecology Medication Protocol Qnlree9909/08/2024 09:24 PM    PASS-PENDING LAST PAP WNL--VIA MANUAL LOOKUP    Physical or Pelvic/Breast in past 12 or next 3 mos--VIA MANUAL LOOKUP      To be filled at: [id number] [place], [place] - [address] AT [facility] [address], [phone], [phone]          Last refill: 6/14/24  Last appt: 9/16/23 with Dr. Licona. Has not seen Dr. Hancock yet.  Next appt: No future appointments.      Forward to Dr. Hancock, please advise on refills. Thank you.

## 2024-10-21 ENCOUNTER — OFFICE VISIT (OUTPATIENT)
Dept: FAMILY MEDICINE CLINIC | Facility: CLINIC | Age: 24
End: 2024-10-21
Payer: COMMERCIAL

## 2024-10-21 ENCOUNTER — PATIENT MESSAGE (OUTPATIENT)
Dept: FAMILY MEDICINE CLINIC | Facility: CLINIC | Age: 24
End: 2024-10-21

## 2024-10-21 VITALS
OXYGEN SATURATION: 100 % | WEIGHT: 107 LBS | RESPIRATION RATE: 16 BRPM | TEMPERATURE: 99 F | SYSTOLIC BLOOD PRESSURE: 118 MMHG | DIASTOLIC BLOOD PRESSURE: 60 MMHG | BODY MASS INDEX: 18.27 KG/M2 | HEIGHT: 64 IN | HEART RATE: 94 BPM

## 2024-10-21 DIAGNOSIS — J01.90 ACUTE NON-RECURRENT SINUSITIS, UNSPECIFIED LOCATION: ICD-10-CM

## 2024-10-21 DIAGNOSIS — Z00.00 ANNUAL PHYSICAL EXAM: Primary | ICD-10-CM

## 2024-10-21 DIAGNOSIS — F41.9 ANXIETY: ICD-10-CM

## 2024-10-21 DIAGNOSIS — R11.0 NAUSEA: ICD-10-CM

## 2024-10-21 RX ORDER — ONDANSETRON 4 MG/1
4 TABLET, FILM COATED ORAL EVERY 8 HOURS PRN
Qty: 6 TABLET | Refills: 0 | Status: SHIPPED | OUTPATIENT
Start: 2024-10-21

## 2024-10-21 RX ORDER — ALPRAZOLAM 0.25 MG/1
0.25 TABLET ORAL DAILY PRN
Qty: 10 TABLET | Refills: 0 | Status: SHIPPED | OUTPATIENT
Start: 2024-10-21

## 2024-10-21 NOTE — PROGRESS NOTES
CHIEF COMPLAINT:    Chief Complaint   Patient presents with    Physical     Discuss anxiety       HISTORY OF PRESENT ILLNESS:    Jessica Llanes is a 24 year old female who has a history of anxiety presents today, October 21, 2024, for an annual physical with one acute uncomplicated problem (sinusitis) and one chronic uncontrolled problem (situational anxiety).    - NUTRITION:  Follows a regular diet.  Drinks approximately 48oz of water a day.   - SLEEP:  Sleeps throughout the night.  Wakes up feeling rested.  Denies snoring.   - SAFETY:  Reports feeling safe at home.  Wears seatbelt   - PHYSICAL ACTIVITY/SOCIAL/HOBBIES:  Achieves 2.5hr of exercise a week.  Working as a teacher.   - GOAL:  Jessica would like to improve health overall by managing anxiety.  States anxiety causes nausea and dry heaving.  Situational.  Jessica has wedding coming up and airplane travel.  Would like medication for social events that cause anxiety.    Hydroxyzine has caused drowsiness.  Discontinued sertraline as it was not helpful and increased abdominal discomfort.    IMMUNIZATIONS:  Declines today    Menstrual periods are described as heavy, regular, lasting about 4 days.  Denies use of multivitamin or iron supplement.    Jessica also reports two week history of sinus pressure and headaches without improvement with use of dayquil and nyquil.  Denies use of flonase or antihistamine.    Patient Active Problem List   Diagnosis    Routine infant or child health check    Acne vulgaris    History of abscess of skin and subcutaneous tissue        ALLERGIES:  Allergies[1]    CURRENT MEDICATIONS:  Current Outpatient Medications   Medication Sig Dispense Refill    DROSPIRENONE-ETHINYL ESTRADIOL 3-0.02 MG Oral Tab TAKE 1 TABLET BY MOUTH DAILY 84 tablet 0    spironolactone 50 MG Oral Tab Take 1 tablet (50 mg total) by mouth 2 (two) times daily.         MEDICAL HISTORY:  Past Medical History:    Hydradenitis    Routine infant or child health check     Past  Surgical History:   Procedure Laterality Date    Myringotomy, laser-assisted      Other      wisdom teeth    Other surgical history Bilateral 02/09/2018    Incision and drainage of bilateral axillary abscesses, complex.      Family History   Problem Relation Age of Onset    Heart Disorder Father 43        MI-~2014, smoker    Psychiatric Father         depression, anxiety    Cancer Maternal Grandfather         prostate    Hypertension Maternal Grandfather     Cancer Paternal Grandfather         prostate    Psychiatric Brother         ADHD     Family Status   Relation Status    Fa Alive    Mo Alive    James (Not Specified)    Son (Not Specified)    MGMA Alive    MGFA Alive    PGMA Alive    PGFA Alive    Sis (Not Specified)    Bro Alive     Social History     Socioeconomic History    Marital status: Single    Years of education: 5   Occupational History    Occupation: student   Tobacco Use    Smoking status: Never    Smokeless tobacco: Never    Tobacco comments:     Non-Smoker   Vaping Use    Vaping status: Never Used   Substance and Sexual Activity    Alcohol use: Yes     Alcohol/week: 0.0 standard drinks of alcohol     Comment: occ    Drug use: No       ROS:    GENERAL:  Denies fever or chills  RESPIRATORY:  Denies difficulty breathing  CARDIAC:  Denies chest pain with exertion  GI:  +nausea  Denies vomiting, diarrhea, constipation, or blood in stool  :  Denies blood in urine or painful urination  REPRO:  Denies vaginal discharge or pelvic pain  NEURO:  Denies recent falls  MSKL:  Denies joint stiffness or pain  SKIN:  Denies change in texture of moles   PSYCH: Denies thoughts of self harm or harming others     VITALS:    /60   Pulse 94   Temp 99.2 °F (37.3 °C) (Temporal)   Resp 16   Ht 5' 4\" (1.626 m)   Wt 107 lb (48.5 kg)   LMP 10/01/2024   SpO2 100%   BMI 18.37 kg/m²     Ideal body weight: 54.7 kg (120 lb 9.5 oz)  Wt Readings from Last 3 Encounters:   10/21/24 107 lb (48.5 kg)   09/16/23 102 lb (46.3  kg)   06/09/23 98 lb (44.5 kg)     BP Readings from Last 3 Encounters:   10/21/24 118/60   09/16/23 104/60   06/09/23 120/80     Reviewed by Yelitza Martin MS, APRN, FNP-BC    PHYSICAL EXAM:    Constitutional:       Appearance: Normal appearance.  Sitting upright on exam table.  Well developed, well nourished, and in no acute distress.  HENT:      Head: Facial features symmetric. Normocephalic and atraumatic.      Right Ear: Canal clear without erythema or drainage.  TM clear and intact, neutral in position.      Left Ear: Canal clear without erythema or drainage.  TM clear and intact, neutral in position.      Nose: Nose turbinates swollen, red.     Mouth/Throat: Mucous membranes are moist.  Uvula rises midline.  Posterior pharynx is mildly erythematous.  Eyes:      Extraocular Movements: Extraocular movements intact.      Conjunctiva/sclera: Conjunctivae normal. Sclera anicteric         Pupils: Pupils are equal, round, and reactive to light.   Neck:     Neck is supple. Trachea is midline.  No masses.      Lymphadenopathy, left sided anterior cervical nodes  Cardiovascular:      Heart sounds: Regular rate and rhythm without murmur.     BLE without edema.  Pulmonary:      Effort: Pulmonary effort is normal.      Breath sounds: Lungs clear throughout.     No cough or wheezing.  Abdominal:      General: Abdomen is nondistended, bowel sounds normoactive, soft, nontender.  No organomegaly.  Musculoskeletal:         General: Normal range of motion. Strength of extremities are equal bilaterally.     Range of motion without limitations.  Skin:     General: Skin is warm and dry.      Coloration: Skin is without jaundice     Findings: No bruising or rashes  Neurological:      General: No focal deficit present. Speech is clear and organized.     Mental Status: Alert and oriented to person, place, and time.      Sensory: Sensation is intact.      Motor: Motor function is intact. Movements are smooth and controlled without  ataxia.     Coordination: Coordination is intact. Coordination normal.      Gait: Gait steady and nonantalgic.      Deep Tendon Reflexes: Reflexes 2+ bilaterally.  Psychiatric:         Mood and Affect: Mood normal.         Behavior: Behavior normal.         Thought Content: Thought content normal.         Judgment: Judgment normal.     ASSESSMENT & PLAN:  Plan on follow-up in 2 weeks or earlier if needed    1. Annual physical exam  +uncontrolled anxiety  +nausea  +sinus pressure  +headaches    2. Anxiety  - ALPRAZolam (XANAX) 0.25 MG Oral Tab; Take 1 tablet (0.25 mg total) by mouth daily as needed for Anxiety.  Dispense: 10 tablet; Refill: 0    3. Nausea  - ondansetron (ZOFRAN) 4 mg tablet; Take 1 tablet (4 mg total) by mouth every 8 (eight) hours as needed for Nausea.  Dispense: 6 tablet; Refill: 0    4. Acute non-recurrent sinusitis, unspecified location  - amoxicillin clavulanate 875-125 MG Oral Tab; Take 1 tablet by mouth 2 (two) times daily for 10 days.  Dispense: 20 tablet; Refill: 0    Follow-up in 2 weeks  Considering iron studies to assess for iron deficiency due to anxiety, nausea, bmi, and headaches  Considering buspirone 7.5mg BID, venlafaxine 37.5mg daily, or citalopram 10mg daily ([employer] message sent for patient to review common side effects)       [1] No Known Allergies

## 2024-11-09 ENCOUNTER — PATIENT MESSAGE (OUTPATIENT)
Dept: FAMILY MEDICINE CLINIC | Facility: CLINIC | Age: 24
End: 2024-11-09

## 2024-11-22 ENCOUNTER — TELEPHONE (OUTPATIENT)
Dept: FAMILY MEDICINE CLINIC | Facility: CLINIC | Age: 24
End: 2024-11-22

## 2024-11-22 DIAGNOSIS — Z30.41 ENCOUNTER FOR SURVEILLANCE OF CONTRACEPTIVE PILLS: Primary | ICD-10-CM

## 2024-11-23 NOTE — TELEPHONE ENCOUNTER
Gynecology Medication Protocol Failed 11/22/2024 10:23 PM   Protocol Details PASS-PENDING LAST PAP WNL--VIA MANUAL LOOKUP    Physical or Pelvic/Breast in past 12 or next 3 mos--VIA MANUAL LOOKUP          Last OV:11/09/2024 OV, 10/21/2024 physical   Last refill:09/09/2024 84 tabs, 0 refill     Medication pended, please sign if appropriate

## 2024-11-24 DIAGNOSIS — Z30.41 ENCOUNTER FOR SURVEILLANCE OF CONTRACEPTIVE PILLS: ICD-10-CM

## 2024-11-24 RX ORDER — DROSPIRENONE AND ETHINYL ESTRADIOL 0.02-3(28)
1 KIT ORAL DAILY
Qty: 28 TABLET | Refills: 0 | Status: SHIPPED | OUTPATIENT
Start: 2024-11-24 | End: 2024-12-27

## 2024-11-25 RX ORDER — DROSPIRENONE AND ETHINYL ESTRADIOL 0.02-3(28)
1 KIT ORAL DAILY
Qty: 84 TABLET | Refills: 0 | OUTPATIENT
Start: 2024-11-25

## 2024-12-16 ENCOUNTER — TELEMEDICINE (OUTPATIENT)
Dept: FAMILY MEDICINE CLINIC | Facility: CLINIC | Age: 24
End: 2024-12-16
Payer: COMMERCIAL

## 2024-12-16 DIAGNOSIS — F41.9 ANXIETY: ICD-10-CM

## 2024-12-16 RX ORDER — CITALOPRAM HYDROBROMIDE 10 MG/1
5 TABLET ORAL DAILY
COMMUNITY
Start: 2024-12-16

## 2024-12-16 RX ORDER — BUSPIRONE HYDROCHLORIDE 5 MG/1
5 TABLET ORAL 2 TIMES DAILY
Qty: 60 TABLET | Refills: 0 | Status: SHIPPED | OUTPATIENT
Start: 2024-12-16 | End: 2025-01-15

## 2024-12-16 NOTE — PROGRESS NOTES
PHONE VISIT    CHIEF COMPLAINT:  Anxiety follow-up    HISTORY OF PRESENT ILLNESS:    Jessica presents today, December 16, 2024, for follow-up on citalopram use.    Reports headaches and difficulty sleeping.  Has noticed improved sleep quality in the past week or so.  Headaches not improving.  Also reports feeling slightly more anxious.    Agreeable to discontinue citalopram.  We will switch to buspar.  We discussed possible side effects.  Patient has been asked to follow-up in 1 month.  Jessica reports change of insurance, we will try to help with the process of transitioning.  Jessica to reach out via [employer]Model if any new concerns/questions.    ALLERGIES:  Allergies[1]    CURRENT MEDICATIONS:  Current Outpatient Medications   Medication Sig Dispense Refill    citalopram 10 MG Oral Tab Take 0.5 tablets (5 mg total) by mouth daily. Begin by taking 1/2 tablet x 1 week, then increase to 1 tablet.      busPIRone 5 MG Oral Tab Take 1 tablet (5 mg total) by mouth in the morning and 1 tablet (5 mg total) before bedtime. 60 tablet 0    Drospirenone-Ethinyl Estradiol 3-0.02 MG Oral Tab TAKE 1 TABLET BY MOUTH DAILY 28 tablet 0    ALPRAZolam (XANAX) 0.25 MG Oral Tab Take 1 tablet (0.25 mg total) by mouth daily as needed for Anxiety. 10 tablet 0    ondansetron (ZOFRAN) 4 mg tablet Take 1 tablet (4 mg total) by mouth every 8 (eight) hours as needed for Nausea. 6 tablet 0    spironolactone 50 MG Oral Tab Take 1 tablet (50 mg total) by mouth 2 (two) times daily.         MEDICAL HISTORY:  Past Medical History:    Hydradenitis    Routine infant or child health check     Past Surgical History:   Procedure Laterality Date    Myringotomy, laser-assisted      Other      wisdom teeth    Other surgical history Bilateral 02/09/2018    Incision and drainage of bilateral axillary abscesses, complex.      Family History   Problem Relation Age of Onset    Heart Disorder Father 43        MI-~2014, smoker    Psychiatric Father         depression, anxiety     Cancer Maternal Grandfather         prostate    Hypertension Maternal Grandfather     Cancer Paternal Grandfather         prostate    Psychiatric Brother         ADHD     Family Status   Relation Status    Fa Alive    Mo Alive    James (Not Specified)    Son (Not Specified)    MGMA Alive    MGFA Alive    PGMA Alive    PGFA Alive    Sis (Not Specified)    Bro Alive     Social History     Socioeconomic History    Marital status: Single    Years of education: 5   Occupational History    Occupation: student   Tobacco Use    Smoking status: Never    Smokeless tobacco: Never    Tobacco comments:     Non-Smoker   Vaping Use    Vaping status: Never Used   Substance and Sexual Activity    Alcohol use: Yes     Alcohol/week: 0.0 standard drinks of alcohol     Comment: occ    Drug use: No       ROS:  GENERAL:  Denies fever   RESPIRATORY:  Denies difficulty breathing  CARDIO:  Denies chest pain with exertion  PSYCH: Denies suicidal or homicidal ideations    VITALS:  No vitals were obtained by clinical staff during this visit.      PHYSICAL EXAM:    Physical exam is limited due to video visit.    Jessica Llanes serves as a reliable historian.  Speech is clear and organized without difficulty speaking in full sentences.    No shortness of breath or cough noted during our conversation.  Overall is feeling well, reports increased anxiety and headaches after starting citalopram.     ASSESSMENT & PLAN:    1. Anxiety  - citalopram 10 MG Oral Tab; Take 0.5 tablets (5 mg total) by mouth daily. Begin by taking 1/2 tablet x 1 week, then increase to 1 tablet.  - busPIRone 5 MG Oral Tab; Take 1 tablet (5 mg total) by mouth in the morning and 1 tablet (5 mg total) before bedtime.  Dispense: 60 tablet; Refill: 0    [facility] message sent with transition plan:  12/17/2024 through 12/22/2024 take 1/2 tablet of citalopram  12/23/2024 stop taking citalopram  12/23/2024 start buspar 5mg twice daily    Consider follow-up 1 month  Insurance/PCP may be  changing at first of the year         [1] No Known Allergies

## 2024-12-27 DIAGNOSIS — Z30.41 ENCOUNTER FOR SURVEILLANCE OF CONTRACEPTIVE PILLS: ICD-10-CM

## 2024-12-27 RX ORDER — DROSPIRENONE AND ETHINYL ESTRADIOL 0.02-3(28)
1 KIT ORAL DAILY
Qty: 28 TABLET | Refills: 0 | Status: SHIPPED | OUTPATIENT
Start: 2024-12-27

## 2024-12-27 NOTE — TELEPHONE ENCOUNTER
Last office visit: 12/16/2024   Protocol: pass  Requested medication(s) are due for refill today: yes  Requested medication(s) are on the active medication list same strength, form, dose/ sig: yes  Requested medication(s) are managed by provider: yes  Patient has already received a courtsey refill: no    NOV: none   Last Labs: 6/9/2023  Asked to Return: 1/16/2025 if insurance is the same

## 2025-01-01 ENCOUNTER — PATIENT MESSAGE (OUTPATIENT)
Dept: FAMILY MEDICINE CLINIC | Facility: CLINIC | Age: 25
End: 2025-01-01

## 2025-01-02 NOTE — TELEPHONE ENCOUNTER
Patient's name and  verified     Patient rash was only on her face. Patient skipped taking the Buspione last night. Patient also used new face lotion.    Patient rash is better today.   Patient wants to know if she should continue medication     Please Advise

## 2025-01-24 DIAGNOSIS — Z30.41 ENCOUNTER FOR SURVEILLANCE OF CONTRACEPTIVE PILLS: ICD-10-CM

## 2025-01-27 DIAGNOSIS — Z30.41 ENCOUNTER FOR SURVEILLANCE OF CONTRACEPTIVE PILLS: ICD-10-CM

## 2025-01-27 NOTE — TELEPHONE ENCOUNTER
DROSPIRENONE/ETHY EST 3/0.02MG T 28     Pt failed refill protocol for the following reasons:    Gynecology Medication Protocol Failed 01/24/2025 08:28 PM   Protocol Details PASS-PENDING LAST PAP WNL--VIA MANUAL LOOKUP    Medication is active on med list    Physical or Pelvic/Breast in past 12 or next 3 mos--VIA MANUAL LOOKUP      Last refill: 12/27/2024, for #28, 0 refill  Last Px: 10/21/2024  Last appt: 12/16/2024  Next appt: No future appointments.    Forward to Dr. Toya Mcneill, please advise on refills. Thank you.

## 2025-01-28 RX ORDER — DROSPIRENONE AND ETHINYL ESTRADIOL 0.02-3(28)
1 KIT ORAL DAILY
Qty: 28 TABLET | Refills: 0 | OUTPATIENT
Start: 2025-01-28

## 2025-01-28 RX ORDER — DROSPIRENONE AND ETHINYL ESTRADIOL 0.02-3(28)
1 KIT ORAL DAILY
Qty: 28 TABLET | Refills: 0 | Status: SHIPPED | OUTPATIENT
Start: 2025-01-28 | End: 2025-01-30

## 2025-01-28 NOTE — TELEPHONE ENCOUNTER
Drospirenone-Ethinyl Estradiol 3-0.02 MG Oral Tab   Pt failed refill protocol for the following reasons:    Gynecology Medication Protocol Failed 01/27/2025 08:43 PM   Protocol Details PASS-PENDING LAST PAP WNL--VIA MANUAL LOOKUP    Medication is active on med list    Physical or Pelvic/Breast in past 12 or next 3 mos--VIA MANUAL LOOKUP      Last refill: 12/27/2024, for #28, 0 refill  Last appt: 12/16/2024  Last Px: 10/21/2024  Next appt: No future appointments.      Forward to Dr. Toya Mcneill, please advise on refills. Thank you.

## 2025-01-30 ENCOUNTER — PATIENT MESSAGE (OUTPATIENT)
Dept: FAMILY MEDICINE CLINIC | Facility: CLINIC | Age: 25
End: 2025-01-30

## 2025-01-30 DIAGNOSIS — Z30.41 ENCOUNTER FOR SURVEILLANCE OF CONTRACEPTIVE PILLS: ICD-10-CM

## 2025-01-30 DIAGNOSIS — F41.9 ANXIETY: ICD-10-CM

## 2025-01-30 RX ORDER — DROSPIRENONE AND ETHINYL ESTRADIOL 0.02-3(28)
1 KIT ORAL DAILY
Qty: 84 TABLET | Refills: 1 | Status: SHIPPED | OUTPATIENT
Start: 2025-01-30

## 2025-01-30 RX ORDER — DROSPIRENONE AND ETHINYL ESTRADIOL 0.02-3(28)
1 KIT ORAL DAILY
Qty: 28 TABLET | Refills: 0 | OUTPATIENT
Start: 2025-01-30

## 2025-01-30 NOTE — TELEPHONE ENCOUNTER
Last OV:telemedicine 12/15/2024, OV 11/09/2024  Last refill:1/28/2025 28 tabs, 0 refill     Medication pended, please sign if appropriate

## 2025-01-30 NOTE — TELEPHONE ENCOUNTER
PATIENT CALLING THIS MORNING.  PATIENT IS A LITTLE CONFUSED WITH MESSAGE.  PATIENT SAYS SHE IS WITH DR PEARL.  ASKING IF [facility] CAN REFILL HER BIRTH CONTROL.        [id number] - [place], [place] - [address] AT [facility] [address], [phone], [phone]   [address] [address]   Phone: [phone] Fax: [phone]

## 2025-01-30 NOTE — TELEPHONE ENCOUNTER
My Chart message     Last OV:telemedicine 12/16/24, 11/09/24  Last refill:12/16/2024 OV    Medication pended, please sign if appropriate

## 2025-01-31 DIAGNOSIS — Z30.41 ENCOUNTER FOR SURVEILLANCE OF CONTRACEPTIVE PILLS: ICD-10-CM

## 2025-01-31 RX ORDER — DROSPIRENONE AND ETHINYL ESTRADIOL 0.02-3(28)
1 KIT ORAL DAILY
Qty: 84 TABLET | Refills: 1 | OUTPATIENT
Start: 2025-01-31

## 2025-01-31 RX ORDER — BUSPIRONE HYDROCHLORIDE 5 MG/1
5 TABLET ORAL 2 TIMES DAILY
Qty: 60 TABLET | Refills: 0 | Status: SHIPPED | OUTPATIENT
Start: 2025-01-31 | End: 2025-03-02

## 2025-02-23 DIAGNOSIS — Z30.41 ENCOUNTER FOR SURVEILLANCE OF CONTRACEPTIVE PILLS: ICD-10-CM

## 2025-02-23 RX ORDER — DROSPIRENONE AND ETHINYL ESTRADIOL 0.02-3(28)
1 KIT ORAL DAILY
Qty: 84 TABLET | Refills: 1 | Status: CANCELLED | OUTPATIENT
Start: 2025-02-23

## 2025-02-24 RX ORDER — DROSPIRENONE AND ETHINYL ESTRADIOL 0.02-3(28)
1 KIT ORAL DAILY
Qty: 84 TABLET | Refills: 1 | Status: SHIPPED | OUTPATIENT
Start: 2025-02-24

## 2025-02-24 NOTE — TELEPHONE ENCOUNTER
Last refill was sent for 90 days plus 1 additional refill. Mychart message sent to patient  making sure she has refills.

## 2025-02-26 ENCOUNTER — PATIENT OUTREACH (OUTPATIENT)
Dept: FAMILY MEDICINE CLINIC | Facility: CLINIC | Age: 25
End: 2025-02-26

## 2025-02-26 NOTE — PROGRESS NOTES
Letter mailed to patient reminding them they have outstanding orders.    Patient is due for a cervical cancer screening

## 2025-03-01 ENCOUNTER — PATIENT MESSAGE (OUTPATIENT)
Dept: FAMILY MEDICINE CLINIC | Facility: CLINIC | Age: 25
End: 2025-03-01

## 2025-03-01 DIAGNOSIS — F41.9 ANXIETY: ICD-10-CM

## 2025-03-03 RX ORDER — BUSPIRONE HYDROCHLORIDE 7.5 MG/1
7.5 TABLET ORAL 2 TIMES DAILY
Qty: 60 TABLET | Refills: 0 | Status: SHIPPED | OUTPATIENT
Start: 2025-03-03 | End: 2025-04-02

## 2025-03-05 DIAGNOSIS — F41.9 ANXIETY: ICD-10-CM

## 2025-03-05 RX ORDER — BUSPIRONE HYDROCHLORIDE 5 MG/1
5 TABLET ORAL 3 TIMES DAILY
Qty: 90 TABLET | Refills: 0 | Status: SHIPPED | OUTPATIENT
Start: 2025-03-05 | End: 2025-04-04

## 2025-04-23 ENCOUNTER — PATIENT MESSAGE (OUTPATIENT)
Dept: FAMILY MEDICINE CLINIC | Facility: CLINIC | Age: 25
End: 2025-04-23

## 2025-04-24 NOTE — TELEPHONE ENCOUNTER
Eye twichting is a temporary condition due to stress, work on stress management to help and hydrate

## 2025-04-25 DIAGNOSIS — F41.9 ANXIETY: ICD-10-CM

## 2025-04-25 RX ORDER — BUSPIRONE HYDROCHLORIDE 10 MG/1
10 TABLET ORAL 2 TIMES DAILY
Qty: 60 TABLET | Refills: 0 | Status: SHIPPED | OUTPATIENT
Start: 2025-04-25 | End: 2025-05-25

## 2025-05-16 RX ORDER — SPIRONOLACTONE 50 MG/1
50 TABLET, FILM COATED ORAL 2 TIMES DAILY
Refills: 0 | Status: CANCELLED | OUTPATIENT
Start: 2025-05-16

## 2025-06-06 ENCOUNTER — TELEPHONE (OUTPATIENT)
Age: 25
End: 2025-06-06

## 2025-06-06 NOTE — TELEPHONE ENCOUNTER
Hello - I am reaching out from the [facility] Navigation department, following up on an order from your provider's office to assist in connecting you with resources for care. If you would like to discuss this further, please give us a call at [phone], or for more immediate assistance you can contact our 24-hour help line at [phone]. We look forward to hearing from you soon.

## 2025-06-27 ENCOUNTER — TELEPHONE (OUTPATIENT)
Age: 25
End: 2025-06-27

## 2025-06-27 NOTE — TELEPHONE ENCOUNTER
Hello,  Sorry I missed you - I am reaching out from the [facility] Navigation department, following up on an order from your provider's office to assist in connecting you with resources for care. If you would like to discuss this further, please give us a call back at [phone], or for more immediate assistance you can contact our 24-hour help line at [phone] We look forward to hearing from you soon.

## 2025-08-24 DIAGNOSIS — Z30.41 ENCOUNTER FOR SURVEILLANCE OF CONTRACEPTIVE PILLS: ICD-10-CM

## 2025-08-28 RX ORDER — DROSPIRENONE AND ETHINYL ESTRADIOL 0.02-3(28)
1 KIT ORAL DAILY
Qty: 84 TABLET | Refills: 3 | Status: SHIPPED | OUTPATIENT
Start: 2025-08-28

## (undated) DIAGNOSIS — L70.0 ACNE VULGARIS: Primary | ICD-10-CM

## (undated) DEVICE — GLOVE BIOGEL M SURG SZ 71/2

## (undated) DEVICE — ADHESIVE MASTISOL 2/3CC VL

## (undated) DEVICE — SUTURE MONOCRYL 4-0 PS-2

## (undated) DEVICE — MINI LAP PACK-LF: Brand: MEDLINE INDUSTRIES, INC.

## (undated) DEVICE — SHEET, DRAPE, SPLIT, STERILE: Brand: MEDLINE

## (undated) DEVICE — HEMOCLIP HORIZON MED 002200

## (undated) DEVICE — 3M™ TEGADERM™ TRANSPARENT FILM DRESSING, 1626W, 4 IN X 4-3/4 IN (10 CM X 12 CM), 50 EACH/CARTON, 4 CARTON/CASE: Brand: 3M™ TEGADERM™

## (undated) DEVICE — SOL  .9 1000ML BTL

## (undated) DEVICE — DRAPE HALF 40X58 DYNJP2410

## (undated) DEVICE — GAUZE PACKING IODOFORM 1/4X5

## (undated) DEVICE — GAUZE SPONGES,USP TYPE VII GAUZE, 12 PLY: Brand: CURITY

## (undated) DEVICE — SUTURE VICRYL 3-0 SH

## (undated) NOTE — LETTER
Date: 11/30/2019    Patient Name: Maria De Jesus Rubi          To Whom it may concern: The above patient was seen at the [facility] for treatment of a medical condition.     This patient should be excused from attending cheer 12/3/19 and 12/4/19

## (undated) NOTE — MR AVS SNAPSHOT
[address] [address]  [phone]               Thank you for choosing us for your health care visit with Darline Nieves MD.  We are glad to serve you and happy to provide you with this sum

## (undated) NOTE — LETTER
18    Patient: Marline Esteban  : 3/10/2000 Visit date: 2018    Dear  Dr. Mary Wooten MD,    Thank you for referring Marline Esteban to my practice. Please find my assessment and plan below. I saw Tristin Oconnor in the office today.   She is significant

## (undated) NOTE — LETTER
Jessica Llanes   [address] [facility] [address]           Dear Jessica Llanes     Our records indicate that you have outstanding lab work and or testing that was ordered for you and has not yet been completed:   Cervical cancer screening (Pap smear)  Yelitza Martin our nurse practitioner is also available if you would like to schedule your pap smear with a female provider.  To provide you with the best possible care, please complete these orders at your earliest convenience. If you have recently completed these orders please disregard this letter.     If you have any questions please call the office at [phone].     Thank you,     [facility]

## (undated) NOTE — LETTER
[facility] [place]  [address] [id number]  Dept: [phone]  Dept Fax: [phone]      February 4, 2018    Patient: Sorin Quinones   Date of Visit: 2/4/2018       To Whom It May Concern:    Sorin Quinones was seen and treated i